# Patient Record
(demographics unavailable — no encounter records)

---

## 2025-02-14 NOTE — PHYSICAL EXAM
[No Acute Distress] : no acute distress [Well Developed] : well developed [Well-Appearing] : well-appearing [Normal Sclera/Conjunctiva] : normal sclera/conjunctiva [PERRL] : pupils equal round and reactive to light [EOMI] : extraocular movements intact [Normal Outer Ear/Nose] : the outer ears and nose were normal in appearance [Normal Nasal Mucosa] : the nasal mucosa was normal [No Lymphadenopathy] : no lymphadenopathy [Supple] : supple [No Respiratory Distress] : no respiratory distress  [No Accessory Muscle Use] : no accessory muscle use [Clear to Auscultation] : lungs were clear to auscultation bilaterally [Normal Rate] : normal rate  [Regular Rhythm] : with a regular rhythm [Normal S1, S2] : normal S1 and S2 [No Edema] : there was no peripheral edema [Soft] : abdomen soft [Non Tender] : non-tender [Non-distended] : non-distended [Normal Bowel Sounds] : normal bowel sounds [Normal Posterior Cervical Nodes] : no posterior cervical lymphadenopathy [Normal Anterior Cervical Nodes] : no anterior cervical lymphadenopathy [No CVA Tenderness] : no CVA  tenderness [No Spinal Tenderness] : no spinal tenderness [No Joint Swelling] : no joint swelling [Grossly Normal Strength/Tone] : grossly normal strength/tone [Coordination Grossly Intact] : coordination grossly intact [Normal Gait] : normal gait [Normal Affect] : the affect was normal [Normal Insight/Judgement] : insight and judgment were intact

## 2025-02-20 NOTE — HISTORY OF PRESENT ILLNESS
[FreeTextEntry1] : Follow Up [de-identified] : The patient presents today to discuss his lab results. Feels well today  Reports well controlled home BG sudden onset

## 2025-02-20 NOTE — PHYSICAL EXAM
[No Acute Distress] : no acute distress [Well-Appearing] : well-appearing [Normal Sclera/Conjunctiva] : normal sclera/conjunctiva [EOMI] : extraocular movements intact [Normal Outer Ear/Nose] : the outer ears and nose were normal in appearance [Normal Nasal Mucosa] : the nasal mucosa was normal [No Respiratory Distress] : no respiratory distress  [No Accessory Muscle Use] : no accessory muscle use [No Edema] : there was no peripheral edema [No Joint Swelling] : no joint swelling [Grossly Normal Strength/Tone] : grossly normal strength/tone [Coordination Grossly Intact] : coordination grossly intact [Normal Gait] : normal gait [Normal Affect] : the affect was normal [Normal Insight/Judgement] : insight and judgment were intact

## 2025-02-20 NOTE — HISTORY OF PRESENT ILLNESS
[(Patient denies any chest pain, claudication, dyspnea on exertion, orthopnea, palpitations or syncope)] : Patient denies any chest pain, claudication, dyspnea on exertion, orthopnea, palpitations or syncope [____ METs%] : [unfilled] METs% [FreeTextEntry1] : Prostatic Artery Embolization  [FreeTextEntry2] : 2/25 [FreeTextEntry4] : The patient is a 70yo male with pmhx of DM, HLD, HTN who presents today for medical optimization for Prostatic Artery Embolization   Pt denies cp or SOB with going up 2 flights of stairs. Denies snoring or hx of sleep apnea. Denies previous adverse reaction with anesthesia. Denies history of structural or arrhythmic cardiac disease.   He states he follows with cardiology for HTN and HLD He states with endocrinology for hx of DM

## 2025-02-20 NOTE — REVIEW OF SYSTEMS
[Fever] : no fever [Chills] : no chills [Nasal Discharge] : no nasal discharge [Sore Throat] : no sore throat [Chest Pain] : no chest pain [Palpitations] : no palpitations [Claudication] : no  leg claudication [Lower Ext Edema] : no lower extremity edema [Orthopena] : no orthopnea [Paroxysmal Nocturnal Dyspnea] : no paroxysmal nocturnal dyspnea [Shortness Of Breath] : no shortness of breath [Wheezing] : no wheezing [Cough] : no cough [Dyspnea on Exertion] : not dyspnea on exertion [Abdominal Pain] : no abdominal pain [Diarrhea] : no diarrhea [Melena] : no melena [Hematuria] : no hematuria [Muscle Weakness] : no muscle weakness [Headache] : no headache [Dizziness] : no dizziness [Fainting] : no fainting [Easy Bleeding] : no easy bleeding [Easy Bruising] : no easy bruising

## 2025-02-20 NOTE — PLAN
[FreeTextEntry1] : 1. Pre-Op - Pre-op labs reviewed with pt -- grossly wnl - Patient is medically optimized for Prostatic Artery Embolization on 2/25  2. HTN - chronic, stable - on losartan  - CMP reviewed with pt - follows with cardiology  3. DM - A1c 7.4 - reports well controlled home BG - on metformin, farxiga, mounjaro, and humalog - on statin and ARB - follows with endocrinology  f/u for CPE after surgery

## 2025-02-20 NOTE — PLAN
[FreeTextEntry1] : 1. Pre-op Eval - Prostatic Artery Embolization on 2/25 - Pt denies known hx of structural or arrhythmic cardiac conditions, sleep apnea, previous adverse reactions to anesthesia  - Able to complete >4 METs - CBC, CMP, PT/INR, PTT ordered - pt with hx of DM -- A1c 7.4%; BG today 132; reports FBG on home reads <140's - EKG reviewed -- NSR; no ST elevations  - Medical optimization pending above   Addendum 2/20/25: - Pre-op labs reviewed with pt -- grossly wnl - Patient is medically optimized for Prostatic Artery Embolization on 2/25

## 2025-03-11 NOTE — HISTORY OF PRESENT ILLNESS
[FreeTextEntry1] : JESUS [de-identified] : Patient presents today for an AWV. Feels well today  Vaccinations: per pt UTD Flu, covid19 x2 and 1 booster, Tdap- per pt UTD, Shingrix-per pt UTD, PCV20 - due  Last colonoscopy: per pt was 2 years ago -- reports he is next due in 2 more years  UTD with routine dental and eye exams  Follows with orthopedics for hx of arthritis Follows with cardiology for hx of HTN and HLD Follows with oncologist for hx of pancreatic cancer  Follows with urologist for hx BPH

## 2025-03-11 NOTE — PLAN
[FreeTextEntry1] : 1. CPE - Routine labs ordered -- labs in chart reviewed; TSH and lipid panel ordered  - Vaccinations: per pt UTD Flu, covid19 x2 and 1 booster, Tdap- per pt UTD, Shingrix-per pt UTD, PCV20 - due; PCV20 administered to left deltoid today --- well tolerated  - Colon cancer screening; per pt UTD and next due in 2 years; follows with GI - states PSA was ordered by his urologist  - Depression screen with PHQ2 negative - UTD with dental and eye exams  2. HTN - chronic, stable - on losartan - CMP reviewed with pt - follows with cardiology  3. DM - A1c 7.4 - reports he can have BG highs at times -- recommended f/u with his endocrinologist  - on metformin, farxiga, mounjaro, and humalog - on statin and ARB - follows with endocrinology  4. Acid Reflux - on esomeprazole - follow with GI  5. BPH and ED - on tadalafil  - follows with urology   6. Hx of Pancreatic Cancer  Follows with oncologist and GI for hx of pancreatic cancer -- for observation

## 2025-03-11 NOTE — HEALTH RISK ASSESSMENT
[Good] : ~his/her~  mood as  good [No] : In the past 12 months have you used drugs other than those required for medical reasons? No [No falls in past year] : Patient reported no falls in the past year [Little interest or pleasure doing things] : 1) Little interest or pleasure doing things [Feeling down, depressed, or hopeless] : 2) Feeling down, depressed, or hopeless [0] : 2) Feeling down, depressed, or hopeless: Not at all (0) [PHQ-2 Negative - No further assessment needed] : PHQ-2 Negative - No further assessment needed [None] : Patient does not have any barriers to medication adherence [Yes] : Reviewed medication list for presence of high-risk medications. [Never] : Never [NO] : No [HIV test declined] : HIV test declined [Hepatitis C test declined] : Hepatitis C test declined [With Family] : lives with family [Retired] : retired [] :  [Sexually Active] : sexually active [Feels Safe at Home] : Feels safe at home [Fully functional (bathing, dressing, toileting, transferring, walking, feeding)] : Fully functional (bathing, dressing, toileting, transferring, walking, feeding) [Fully functional (using the telephone, shopping, preparing meals, housekeeping, doing laundry, using] : Fully functional and needs no help or supervision to perform IADLs (using the telephone, shopping, preparing meals, housekeeping, doing laundry, using transportation, managing medications and managing finances) [Smoke Detector] : smoke detector [Carbon Monoxide Detector] : carbon monoxide detector [Seat Belt] :  uses seat belt [Patient/Caregiver not ready to engage] : , patient/caregiver not ready to engage [de-identified] : uses exercise machine at home  [de-identified] : feels it is balanced  [[id number]] : 0 [Change in mental status noted] : No change in mental status noted [High Risk Behavior] : no high risk behavior [Reports changes in hearing] : Reports no changes in hearing [Reports changes in vision] : Reports no changes in vision [Reports changes in dental health] : Reports no changes in dental health

## 2025-04-25 NOTE — PLAN
[FreeTextEntry1] : The patient presents today for rash on groin area which has not improved with ketoconazole. Also reports itchy rash on penis  - failed ketoconazole - Rx for triamcinolone ordered - on exam(performed with chaperone) no rash now on penis -- pt states it has resolved -- may c/w ketoconazole PRN - Referral for dermatology provided for if no improvement with triamcinolone

## 2025-04-25 NOTE — PHYSICAL EXAM
[No Acute Distress] : no acute distress [Well-Appearing] : well-appearing [Normal Sclera/Conjunctiva] : normal sclera/conjunctiva [EOMI] : extraocular movements intact [Normal Outer Ear/Nose] : the outer ears and nose were normal in appearance [Normal Nasal Mucosa] : the nasal mucosa was normal [No Respiratory Distress] : no respiratory distress  [No Accessory Muscle Use] : no accessory muscle use [Normal Rate] : normal rate  [Normal S1, S2] : normal S1 and S2 [No Edema] : there was no peripheral edema [No Joint Swelling] : no joint swelling [Grossly Normal Strength/Tone] : grossly normal strength/tone [Coordination Grossly Intact] : coordination grossly intact [Normal Gait] : normal gait [Normal Affect] : the affect was normal [Normal Insight/Judgement] : insight and judgment were intact [de-identified] : +exam performed with chaperone present(Margarita) -- no rash noted on penis  [de-identified] : +two small area of dry skin with some scabing 2/2 itching -- no signs of infections

## 2025-04-25 NOTE — HISTORY OF PRESENT ILLNESS
[FreeTextEntry8] : The patient presents today for rash on groin area which has not improved with ketoconazole. Also reports itchy rash on penis